# Patient Record
Sex: FEMALE | Race: WHITE | NOT HISPANIC OR LATINO | URBAN - METROPOLITAN AREA
[De-identification: names, ages, dates, MRNs, and addresses within clinical notes are randomized per-mention and may not be internally consistent; named-entity substitution may affect disease eponyms.]

---

## 2017-08-10 ENCOUNTER — FOLLOW UP ESTABLISHED (OUTPATIENT)
Dept: URBAN - METROPOLITAN AREA CLINIC 64 | Facility: CLINIC | Age: 27
End: 2017-08-10
Payer: COMMERCIAL

## 2017-08-10 DIAGNOSIS — H52.13 MYOPIA, BILATERAL: Primary | ICD-10-CM

## 2017-08-10 PROCEDURE — 92015 DETERMINE REFRACTIVE STATE: CPT | Performed by: OPTOMETRIST

## 2017-08-10 PROCEDURE — 92014 COMPRE OPH EXAM EST PT 1/>: CPT | Performed by: OPTOMETRIST

## 2017-08-10 ASSESSMENT — INTRAOCULAR PRESSURE
OS: 15
OD: 14

## 2017-08-10 ASSESSMENT — KERATOMETRY
OD: 94.70
OS: 45.35

## 2017-08-10 ASSESSMENT — VISUAL ACUITY
OD: 20/20
OS: 20/20

## 2019-05-29 ENCOUNTER — FOLLOW UP ESTABLISHED (OUTPATIENT)
Dept: URBAN - METROPOLITAN AREA CLINIC 64 | Facility: CLINIC | Age: 29
End: 2019-05-29
Payer: COMMERCIAL

## 2019-05-29 PROCEDURE — 92015 DETERMINE REFRACTIVE STATE: CPT | Performed by: OPTOMETRIST

## 2019-05-29 PROCEDURE — 92014 COMPRE OPH EXAM EST PT 1/>: CPT | Performed by: OPTOMETRIST

## 2019-05-29 ASSESSMENT — KERATOMETRY
OS: 45.56
OD: 44.89

## 2019-05-29 ASSESSMENT — INTRAOCULAR PRESSURE
OS: 9
OD: 7

## 2019-05-29 ASSESSMENT — VISUAL ACUITY
OS: 20/25
OD: 20/20

## 2021-01-18 ENCOUNTER — FOLLOW UP ESTABLISHED (OUTPATIENT)
Dept: URBAN - METROPOLITAN AREA CLINIC 64 | Facility: CLINIC | Age: 31
End: 2021-01-18
Payer: COMMERCIAL

## 2021-01-18 DIAGNOSIS — H18.603 KERATOCONUS, UNSPECIFIED, BILATERAL: Primary | ICD-10-CM

## 2021-01-18 PROCEDURE — 92014 COMPRE OPH EXAM EST PT 1/>: CPT | Performed by: OPTOMETRIST

## 2021-01-18 ASSESSMENT — INTRAOCULAR PRESSURE
OD: 13
OS: 12

## 2021-01-18 ASSESSMENT — KERATOMETRY
OD: 45.08
OS: 45.73

## 2021-01-18 ASSESSMENT — VISUAL ACUITY
OD: 20/20
OS: 20/25

## 2021-04-28 ENCOUNTER — OFFICE VISIT (OUTPATIENT)
Dept: URBAN - METROPOLITAN AREA CLINIC 64 | Facility: CLINIC | Age: 31
End: 2021-04-28

## 2021-04-28 PROCEDURE — 92310 CONTACT LENS FITTING OU: CPT | Performed by: OPTOMETRIST

## 2021-04-28 NOTE — IMPRESSION/PLAN
Impression: Myopia, bilateral Plan: Interested in CLs due to having to wear safety glasses over her Rx glasses at work. Good fit and vision so far today. Ok to I/R.  RTC for CL check in 2 wks.

## 2021-05-12 ENCOUNTER — OFFICE VISIT (OUTPATIENT)
Dept: URBAN - METROPOLITAN AREA CLINIC 64 | Facility: CLINIC | Age: 31
End: 2021-05-12

## 2021-05-12 PROCEDURE — 92310 CONTACT LENS FITTING OU: CPT | Performed by: OPTOMETRIST

## 2022-12-28 ENCOUNTER — OFFICE VISIT (OUTPATIENT)
Dept: URBAN - METROPOLITAN AREA CLINIC 64 | Facility: CLINIC | Age: 32
End: 2022-12-28
Payer: COMMERCIAL

## 2022-12-28 DIAGNOSIS — H52.13 MYOPIA, BILATERAL: Primary | ICD-10-CM

## 2022-12-28 DIAGNOSIS — H18.603 KERATOCONUS, UNSPECIFIED, BILATERAL: ICD-10-CM

## 2022-12-28 PROCEDURE — 92014 COMPRE OPH EXAM EST PT 1/>: CPT

## 2022-12-28 ASSESSMENT — VISUAL ACUITY
OS: 20/20
OD: 20/25

## 2022-12-28 ASSESSMENT — INTRAOCULAR PRESSURE
OS: 10
OD: 17

## 2022-12-28 ASSESSMENT — KERATOMETRY
OD: 45.05
OS: 45.88

## 2022-12-28 NOTE — IMPRESSION/PLAN
Impression: Bilateral keratoconus Plan: Forme Fruste KCS OS worse than OD.  + Fam hx (brother had crosslinking procedure). No ocular signs or change in Rx. Re-check in one year or sooner if vision worsens. Repeat pentacam next year.

## 2024-04-03 ENCOUNTER — OFFICE VISIT (OUTPATIENT)
Dept: URBAN - METROPOLITAN AREA CLINIC 64 | Facility: LOCATION | Age: 34
End: 2024-04-03
Payer: COMMERCIAL

## 2024-04-03 DIAGNOSIS — H18.603 KERATOCONUS, UNSPECIFIED, BILATERAL: ICD-10-CM

## 2024-04-03 DIAGNOSIS — H52.13 MYOPIA, BILATERAL: Primary | ICD-10-CM

## 2024-04-03 DIAGNOSIS — H18.623 KERATOCONUS, UNSTABLE, BILATERAL: ICD-10-CM

## 2024-04-03 PROCEDURE — 92025 CPTRIZED CORNEAL TOPOGRAPHY: CPT

## 2024-04-03 PROCEDURE — 92012 INTRM OPH EXAM EST PATIENT: CPT

## 2024-04-03 PROCEDURE — 92310 CONTACT LENS FITTING OU: CPT

## 2024-04-03 ASSESSMENT — INTRAOCULAR PRESSURE
OS: 12
OD: 12

## 2024-04-03 ASSESSMENT — KERATOMETRY
OD: 45.33
OS: 45.92

## 2024-04-03 ASSESSMENT — VISUAL ACUITY
OD: 20/20
OS: 20/20

## 2024-10-16 ENCOUNTER — OFFICE VISIT (OUTPATIENT)
Dept: URBAN - METROPOLITAN AREA CLINIC 10 | Facility: CLINIC | Age: 34
End: 2024-10-16
Payer: COMMERCIAL

## 2024-10-16 DIAGNOSIS — H18.603 KERATOCONUS, UNSPECIFIED, BILATERAL: Primary | ICD-10-CM

## 2024-10-16 PROCEDURE — 92025 CPTRIZED CORNEAL TOPOGRAPHY: CPT | Performed by: OPHTHALMOLOGY

## 2024-10-16 PROCEDURE — 99204 OFFICE O/P NEW MOD 45 MIN: CPT | Performed by: OPHTHALMOLOGY

## 2024-10-16 PROCEDURE — 76514 ECHO EXAM OF EYE THICKNESS: CPT | Performed by: OPHTHALMOLOGY

## 2024-10-16 ASSESSMENT — VISUAL ACUITY
OS: 20/25
OD: 20/20

## 2024-10-16 ASSESSMENT — KERATOMETRY
OD: 45.38
OS: 46.25

## 2024-10-16 ASSESSMENT — INTRAOCULAR PRESSURE
OS: 15
OD: 14

## 2025-04-03 ENCOUNTER — OFFICE VISIT (OUTPATIENT)
Dept: URBAN - METROPOLITAN AREA CLINIC 64 | Facility: LOCATION | Age: 35
End: 2025-04-03
Payer: COMMERCIAL

## 2025-04-03 DIAGNOSIS — H52.13 MYOPIA, BILATERAL: ICD-10-CM

## 2025-04-03 DIAGNOSIS — H18.603 KERATOCONUS, UNSPECIFIED, BILATERAL: Primary | ICD-10-CM

## 2025-04-03 PROCEDURE — 99214 OFFICE O/P EST MOD 30 MIN: CPT | Performed by: OPTOMETRIST

## 2025-04-03 ASSESSMENT — INTRAOCULAR PRESSURE
OD: 13
OS: 14

## 2025-04-03 ASSESSMENT — VISUAL ACUITY
OD: 20/20
OS: 20/20